# Patient Record
Sex: FEMALE | Race: WHITE | Employment: FULL TIME | URBAN - METROPOLITAN AREA
[De-identification: names, ages, dates, MRNs, and addresses within clinical notes are randomized per-mention and may not be internally consistent; named-entity substitution may affect disease eponyms.]

---

## 2017-03-19 ENCOUNTER — OFFICE VISIT (OUTPATIENT)
Dept: URGENT CARE | Facility: URGENT CARE | Age: 27
End: 2017-03-19
Payer: COMMERCIAL

## 2017-03-19 VITALS
SYSTOLIC BLOOD PRESSURE: 100 MMHG | WEIGHT: 174 LBS | HEIGHT: 66 IN | DIASTOLIC BLOOD PRESSURE: 60 MMHG | OXYGEN SATURATION: 99 % | HEART RATE: 78 BPM | TEMPERATURE: 99.1 F | BODY MASS INDEX: 27.97 KG/M2

## 2017-03-19 DIAGNOSIS — Z3A.16 16 WEEKS GESTATION OF PREGNANCY: ICD-10-CM

## 2017-03-19 DIAGNOSIS — R30.0 DYSURIA: Primary | ICD-10-CM

## 2017-03-19 LAB
ALBUMIN UR-MCNC: NEGATIVE MG/DL
APPEARANCE UR: CLEAR
BILIRUB UR QL STRIP: NEGATIVE
COLOR UR AUTO: YELLOW
GLUCOSE UR STRIP-MCNC: NEGATIVE MG/DL
HGB UR QL STRIP: NEGATIVE
KETONES UR STRIP-MCNC: NEGATIVE MG/DL
LEUKOCYTE ESTERASE UR QL STRIP: NEGATIVE
MICRO REPORT STATUS: NORMAL
NITRATE UR QL: NEGATIVE
PH UR STRIP: 6.5 PH (ref 5–7)
SP GR UR STRIP: 1.01 (ref 1–1.03)
SPECIMEN SOURCE: NORMAL
URN SPEC COLLECT METH UR: NORMAL
UROBILINOGEN UR STRIP-ACNC: 0.2 EU/DL (ref 0.2–1)
WET PREP SPEC: NORMAL

## 2017-03-19 PROCEDURE — 81003 URINALYSIS AUTO W/O SCOPE: CPT | Performed by: INTERNAL MEDICINE

## 2017-03-19 PROCEDURE — 87210 SMEAR WET MOUNT SALINE/INK: CPT | Performed by: INTERNAL MEDICINE

## 2017-03-19 PROCEDURE — 99213 OFFICE O/P EST LOW 20 MIN: CPT | Performed by: FAMILY MEDICINE

## 2017-03-19 NOTE — PATIENT INSTRUCTIONS
1. Dysuria:   16 weeks gestation of pregnancy: UA unremarkable. Wet prep negative   -Encouraged to drink plenty of fluids.   - *UA reflex to Microscopic and Culture ([facility] (except [place] and [place])  - Wet prep

## 2017-03-19 NOTE — NURSING NOTE
"Chief Complaint   Patient presents with     Urgent Care     UTI     16 weeks pregnant,urgency,chills,pressure in abdomin        Initial /60  Pulse 78  Temp 99.1  F (37.3  C) (Oral)  Ht 5' 6\" (1.676 m)  Wt 174 lb (78.9 kg)  LMP 11/27/2016  SpO2 99%  BMI 28.08 kg/m2 Estimated body mass index is 28.08 kg/(m^2) as calculated from the following:    Height as of this encounter: 5' 6\" (1.676 m).    Weight as of this encounter: 174 lb (78.9 kg).  Medication Reconciliation: complete  "

## 2017-03-19 NOTE — PROGRESS NOTES
"SUBJECTIVE:   Jaleesa Orozco is a 27 year old female who  presents today for a possible UTI. Symptoms of urgency and frequency have been going on for 1week(s). She is currently at 16 weeks pregnant.  Hematuria no.  gradual onset and mild. Positive for chills and vaginal discharge. There is no history of fever, nausea or vomiting.  No history of vaginal or penile discharge. This patient does  have a history of urinary tract infections. Patient denies flank pain, temperature > 101 degrees F. and Vomiting, significant nausea or diarrhea.     No past medical history on file.  Current Outpatient Prescriptions   Medication Sig Dispense Refill     Prenatal Multivit-Min-Fe-FA (PRENATAL VITAMINS PO)        VITAMIN D, CHOLECALCIFEROL, PO Take by mouth daily       Social History   Substance Use Topics     Smoking status: Never Smoker     Smokeless tobacco: Not on file     Alcohol use Not on file       ROS:   CONSTITUTIONAL:NEGATIVE for fever, chills, change in weight  RESP:NEGATIVE for significant cough or SOB  CV: NEGATIVE for chest pain, palpitations or peripheral edema  : Positive for frequency and urgency     OBJECTIVE:  /60  Pulse 78  Temp 99.1  F (37.3  C) (Oral)  Ht 5' 6\" (1.676 m)  Wt 174 lb (78.9 kg)  LMP 11/27/2016  SpO2 99%  BMI 28.08 kg/m2  GENERAL APPEARANCE: healthy, alert and no distress  RESP: lungs clear to auscultation - no rales, rhonchi or wheezes  CV: regular rates and rhythm, normal S1 S2, no murmur noted  ABDOMEN:  soft, nontender, no HSM or masses and bowel sounds normal. Gravid    ASSESSMENT:   1. Increase urinary frequency   2. 16 weeks gestation of pregnancy: UA unremarkable. Wet prep negative   -Encouraged to drink plenty of fluids.   - *UA reflex to Microscopic and Culture ([facility] (except [place] and [place])  - Wet prep      Gigi Escobar MD  [facility]  "

## 2017-03-19 NOTE — MR AVS SNAPSHOT
"              After Visit Summary   3/19/2017    Jaleesa Orozco    MRN: [id number]           Patient Information     Date Of Birth          1990        Visit Information        Provider Department      3/19/2017 12:30 PM Gigi Escobar MD [facility]        Today's Diagnoses     Dysuria    -  1    16 weeks gestation of pregnancy          Care Instructions    1. Dysuria:   16 weeks gestation of pregnancy: UA unremarkable. Wet prep negative   -Encouraged to drink plenty of fluids.   - *UA reflex to Microscopic and Culture ([facility] (except [place] and [place])  - Wet prep          Follow-ups after your visit        Who to contact     If you have questions or need follow up information about today's clinic visit or your schedule please contact [facility] directly at [phone].  Normal or non-critical lab and imaging results will be communicated to you by [employer], letter or phone within 4 business days after the clinic has received the results. If you do not hear from us within 7 days, please contact the clinic through [employer] or phone. If you have a critical or abnormal lab result, we will notify you by phone as soon as possible.  Submit refill requests through [employer] or call your pharmacy and they will forward the refill request to us. Please allow 3 business days for your refill to be completed.          Additional Information About Your Visit        MyChart Information     [employer] lets you send messages to your doctor, view your test results, renew your prescriptions, schedule appointments and more. To sign up, go to [email or web address] . Click on \"Log in\" on the left side of the screen, which will take you to the Welcome page. Then click on \"Sign up Now\" on the right side of the page.     You will be asked to enter the access code listed below, as well as some personal information. Please follow the directions to " "create your username and password.     Your access code is: [email or web address]  Expires: 2017  2:31 PM     Your access code will  in 90 days. If you need help or a new code, please call your [place] clinic or [phone].        Care EveryWhere ID     This is your Care EveryWhere ID. This could be used by other organizations to access your [place] medical records  [id number]        Your Vitals Were     Pulse Temperature Height Last Period Pulse Oximetry BMI (Body Mass Index)    78 99.1  F (37.3  C) (Oral) 5' 6\" (1.676 m) 2016 99% 28.08 kg/m2       Blood Pressure from Last 3 Encounters:   17 100/60    Weight from Last 3 Encounters:   17 174 lb (78.9 kg)              We Performed the Following     *UA reflex to Microscopic and Culture ([facility] [place] [facility] [place] [facility] (except [place] and [place])     Wet prep        Primary Care Provider    None       No address on file        Thank you!     Thank you for choosing [facility]  for your care. Our goal is always to provide you with excellent care. Hearing back from our patients is one way we can continue to improve our services. Please take a few minutes to complete the written survey that you may receive in the mail after your visit with us. Thank you!             Your Updated Medication List - Protect others around you: Learn how to safely use, store and throw away your medicines at www.disposemymeds.org.          This list is accurate as of: 3/19/17  2:31 PM.  Always use your most recent med list.                   Brand Name Dispense Instructions for use    PRENATAL VITAMINS PO          VITAMIN D (CHOLECALCIFEROL) PO      Take by mouth daily         "

## 2018-11-02 ENCOUNTER — OFFICE VISIT (OUTPATIENT)
Dept: FAMILY MEDICINE | Facility: CLINIC | Age: 28
End: 2018-11-02
Payer: COMMERCIAL

## 2018-11-02 VITALS
DIASTOLIC BLOOD PRESSURE: 73 MMHG | HEART RATE: 81 BPM | RESPIRATION RATE: 16 BRPM | OXYGEN SATURATION: 96 % | WEIGHT: 166.38 LBS | BODY MASS INDEX: 26.85 KG/M2 | TEMPERATURE: 97.6 F | SYSTOLIC BLOOD PRESSURE: 101 MMHG

## 2018-11-02 DIAGNOSIS — R07.0 THROAT PAIN: Primary | ICD-10-CM

## 2018-11-02 DIAGNOSIS — Z23 NEED FOR PROPHYLACTIC VACCINATION AND INOCULATION AGAINST INFLUENZA: ICD-10-CM

## 2018-11-02 DIAGNOSIS — B07.0 PLANTAR WARTS: ICD-10-CM

## 2018-11-02 LAB
DEPRECATED S PYO AG THROAT QL EIA: NORMAL
SPECIMEN SOURCE: NORMAL

## 2018-11-02 PROCEDURE — 90471 IMMUNIZATION ADMIN: CPT | Performed by: FAMILY MEDICINE

## 2018-11-02 PROCEDURE — 17110 DESTRUCTION B9 LES UP TO 14: CPT | Performed by: FAMILY MEDICINE

## 2018-11-02 PROCEDURE — 87880 STREP A ASSAY W/OPTIC: CPT | Performed by: FAMILY MEDICINE

## 2018-11-02 PROCEDURE — 87081 CULTURE SCREEN ONLY: CPT | Performed by: FAMILY MEDICINE

## 2018-11-02 PROCEDURE — 90686 IIV4 VACC NO PRSV 0.5 ML IM: CPT | Performed by: FAMILY MEDICINE

## 2018-11-02 PROCEDURE — 99213 OFFICE O/P EST LOW 20 MIN: CPT | Mod: 25 | Performed by: FAMILY MEDICINE

## 2018-11-02 RX ORDER — LEVONORGESTREL AND ETHINYL ESTRADIOL 0.15-0.03
1 KIT ORAL
COMMUNITY
Start: 2018-07-10

## 2018-11-02 NOTE — PROGRESS NOTES
SUBJECTIVE:   Jaleesa Orozco is a 28 year old female who presents to clinic today for the following health issues:    Throat pain      Duration: within 24 hours     Description (location/character/radiation): sore throat, and both ear pain     Accompanying signs and symptoms: none    History (similar episodes/previous evaluation): None    Therapies tried and outcome: None       Addition Concern: Plantar wart left big toe.      Presents with ST x 24 hours.  Finds it difficult to swallow.  No fever, no chills.  Ears feel plugged.  No N/V.  No cough.  1 year old toddler in - multiple sick exposures.    Has warts on LEFT great toe she would like treated today with liquid nitrogen therapy.  Has used other OTC remedies with no resolution.    Problem list and histories reviewed & adjusted, as indicated.  Additional history: as documented    There is no problem list on file for this patient.    History reviewed. No pertinent surgical history.    Social History   Substance Use Topics     Smoking status: Never Smoker     Smokeless tobacco: Never Used     Alcohol use Yes     Family History   Problem Relation Age of Onset     No Known Problems Mother      No Known Problems Father          Current Outpatient Prescriptions   Medication Sig Dispense Refill     levonorgestrel-ethinyl estradiol (SEASONALE) 0.15-0.03 MG per tablet Take 1 tablet by mouth       No Known Allergies  No lab results found.   BP Readings from Last 3 Encounters:   11/02/18 101/73   03/19/17 100/60    Wt Readings from Last 3 Encounters:   11/02/18 166 lb 6 oz (75.5 kg)   03/19/17 174 lb (78.9 kg)                    Reviewed and updated as needed this visit by clinical staff  Tobacco  Allergies  Meds  Med Hx  Surg Hx  Fam Hx  Soc Hx      Reviewed and updated as needed this visit by Provider         ROS:  Constitutional, HEENT, cardiovascular, pulmonary, gi and gu systems are negative, except as otherwise noted.    OBJECTIVE:     /73   Pulse 81  Temp 97.6  F (36.4  C) (Oral)  Resp 16  Wt 166 lb 6 oz (75.5 kg)  LMP  (LMP Unknown)  SpO2 96%  Breastfeeding? No  BMI 26.85 kg/m2  Body mass index is 26.85 kg/(m^2).  GENERAL: healthy, alert and no distress  EYES: Eyes grossly normal to inspection, PERRL and conjunctivae and sclerae normal  HENT: ear canals and TM's normal, nose and mouth without ulcers or lesions, no erythema or exudate of oropharynx today  NECK: no adenopathy, no asymmetry, masses, or scars and thyroid normal to palpation  RESP: lungs clear to auscultation - no rales, rhonchi or wheezes  CV: regular rate and rhythm, normal S1 S2, no S3 or S4, no murmur, click or rub, no peripheral edema and peripheral pulses strong  MS: no gross musculoskeletal defects noted, no edema  SKIN: 3 areas of coalesced warts on plantar surface of LEFT great toe pad-- each area treated with 3- 5 second freeze-thaw cycles of liquid nitrogen therapy.  BandAid applied.  PSYCH: mentation appears normal, affect normal/bright    Diagnostic Test Results:  Results for orders placed or performed in visit on 11/02/18 (from the past 24 hour(s))   Rapid strep screen   Result Value Ref Range    Specimen Description Throat     Rapid Strep A Screen       NEGATIVE: No Group A streptococcal antigen detected by immunoassay, await culture report.       ASSESSMENT/PLAN:     1. Throat pain    - Rapid strep screen  - Beta strep group A culture    Reassured RST is negative.  TC pending.  Recommend supportive cares including Tylenol/ibuprofen prn, increased fluids, lozenges, rest.  Follow-up if no change or worsening symptoms.    2. Plantar warts    - DESTRUCT BENIGN LESION, UP TO 14    Follow-up in 2 weeks for retreatment prn incomplete resolution.    3. Need for prophylactic vaccination and inoculation against influenza    - FLU VACCINE, SPLIT VIRUS, IM (QUADRIVALENT) [09981]- >3 YRS  - Vaccine Administration, Initial [45744]    Flu vaccine updated.    Kathy Hernandez,  MD  [facility]

## 2018-11-02 NOTE — PROGRESS NOTES

## 2018-11-02 NOTE — MR AVS SNAPSHOT
"              After Visit Summary   11/2/2018    Jaleesa Orozco    MRN: [id number]           Patient Information     Date Of Birth          1990        Visit Information        Provider Department      11/2/2018 9:40 AM Kathy Hernandez MD [facility]        Today's Diagnoses     Throat pain    -  1    Plantar warts        Need for prophylactic vaccination and inoculation against influenza          Care Instructions    Flu vaccine then home          Follow-ups after your visit        Follow-up notes from your care team     Return in about 1 week (around 11/9/2018), or if symptoms worsen or fail to improve.      Who to contact     If you have questions or need follow up information about today's clinic visit or your schedule please contact [facility] directly at [phone].  Normal or non-critical lab and imaging results will be communicated to you by MyChart, letter or phone within 4 business days after the clinic has received the results. If you do not hear from us within 7 days, please contact the clinic through MyChart or phone. If you have a critical or abnormal lab result, we will notify you by phone as soon as possible.  Submit refill requests through [employer] or call your pharmacy and they will forward the refill request to us. Please allow 3 business days for your refill to be completed.          Additional Information About Your Visit        MyChart Information     [employer] lets you send messages to your doctor, view your test results, renew your prescriptions, schedule appointments and more. To sign up, go to [email or web address] . Click on \"Log in\" on the left side of the screen, which will take you to the Welcome page. Then click on \"Sign up Now\" on the right side of the page.     You will be asked to enter the access code listed below, as well as some personal information. Please follow the directions to create your username and " password.     Your access code is: [email or web address]  Expires: 2019  8:37 AM     Your access code will  in 90 days. If you need help or a new code, please call your [place] clinic or [phone].        Care EveryWhere ID     This is your Care EveryWhere ID. This could be used by other organizations to access your [place] medical records  [id number]        Your Vitals Were     Pulse Temperature Respirations Last Period Pulse Oximetry Breastfeeding?    81 97.6  F (36.4  C) (Oral) 16 (LMP Unknown) 96% No    BMI (Body Mass Index)                   26.85 kg/m2            Blood Pressure from Last 3 Encounters:   18 101/73   17 100/60    Weight from Last 3 Encounters:   18 166 lb 6 oz (75.5 kg)   17 174 lb (78.9 kg)              We Performed the Following     Beta strep group A culture     DESTRUCT BENIGN LESION, UP TO 14     FLU VACCINE, SPLIT VIRUS, IM (QUADRIVALENT) [12526]- >3 YRS     Rapid strep screen     Vaccine Administration, Initial [90914]          Today's Medication Changes          These changes are accurate as of 18 11:59 PM.  If you have any questions, ask your nurse or doctor.               Stop taking these medicines if you haven't already. Please contact your care team if you have questions.     PRENATAL VITAMINS PO   Stopped by:  Kathy Hernandez MD           VITAMIN D (CHOLECALCIFEROL) PO   Stopped by:  Kathy Hernandez MD                    Primary Care Provider Office Phone # Fax #    Kathy Hernandez [phone] [address] [place] [address]        Equal Access to Services     [facility]: Hadii ricardo Grant, wanalinida luqadaha, qaybta kaalmada julieth, david duval. So [facility] [phone].    ATENCIÓN: Si habla español, tiene a lamb disposición servicios gratuitos de asistencia lingüística. Llame al [phone].    We comply with  applicable federal civil rights laws and [place] laws. We do not discriminate on the basis of race, color, national origin, age, disability, sex, sexual orientation, or gender identity.            Thank you!     Thank you for choosing [facility]  for your care. Our goal is always to provide you with excellent care. Hearing back from our patients is one way we can continue to improve our services. Please take a few minutes to complete the written survey that you may receive in the mail after your visit with us. Thank you!             Your Updated Medication List - Protect others around you: Learn how to safely use, store and throw away your medicines at www.disposemymeds.org.          This list is accurate as of 11/2/18 11:59 PM.  Always use your most recent med list.                   Brand Name Dispense Instructions for use Diagnosis    levonorgestrel-ethinyl estradiol 0.15-0.03 MG per tablet    SEASONALE     Take 1 tablet by mouth

## 2018-11-03 LAB
BACTERIA SPEC CULT: NORMAL
SPECIMEN SOURCE: NORMAL

## 2020-10-02 ENCOUNTER — ANCILLARY PROCEDURE (OUTPATIENT)
Dept: GENERAL RADIOLOGY | Facility: CLINIC | Age: 30
End: 2020-10-02
Attending: NURSE PRACTITIONER
Payer: COMMERCIAL

## 2020-10-02 ENCOUNTER — OFFICE VISIT (OUTPATIENT)
Dept: URGENT CARE | Facility: URGENT CARE | Age: 30
End: 2020-10-02
Payer: COMMERCIAL

## 2020-10-02 VITALS
OXYGEN SATURATION: 96 % | BODY MASS INDEX: 25.63 KG/M2 | TEMPERATURE: 98.8 F | SYSTOLIC BLOOD PRESSURE: 124 MMHG | WEIGHT: 158.8 LBS | RESPIRATION RATE: 16 BRPM | DIASTOLIC BLOOD PRESSURE: 68 MMHG | HEART RATE: 78 BPM

## 2020-10-02 DIAGNOSIS — M25.532 PAIN IN BOTH WRISTS: ICD-10-CM

## 2020-10-02 DIAGNOSIS — M25.531 PAIN IN BOTH WRISTS: ICD-10-CM

## 2020-10-02 DIAGNOSIS — S60.212A CONTUSION OF LEFT WRIST, INITIAL ENCOUNTER: Primary | ICD-10-CM

## 2020-10-02 DIAGNOSIS — M79.641 PAIN OF RIGHT HAND: ICD-10-CM

## 2020-10-02 DIAGNOSIS — M79.642 PAIN OF LEFT HAND: ICD-10-CM

## 2020-10-02 PROCEDURE — 99213 OFFICE O/P EST LOW 20 MIN: CPT | Performed by: NURSE PRACTITIONER

## 2020-10-02 PROCEDURE — 73110 X-RAY EXAM OF WRIST: CPT | Mod: LT | Performed by: RADIOLOGY

## 2020-10-02 PROCEDURE — 73110 X-RAY EXAM OF WRIST: CPT | Mod: RT | Performed by: RADIOLOGY

## 2020-10-02 NOTE — LETTER
October 2, 2020      Jaleesa Orozco  [address] [facility] [address]        To Whom It May Concern:    Jaleesa Orozco  was seen on 10/2/2020. Please allow her to wear a left wrist splint over the next couple of weeks.        Sincerely,        Devi Hoyt NP

## 2020-10-03 NOTE — PATIENT INSTRUCTIONS
Patient Education     RICE     Rest an injury, elevate it, and use ice and compression as directed.   RICE stands for rest, ice, compression, and elevation. These can limit pain and swelling after an injury. RICE may be recommended to help treat breaks (fractures), sprains, strains, and bruises or bumps.   Home care  Here are the details of RICE:    Rest. Limit the use of the injured body part. This helps prevent further damage to the body part and gives it time to heal. In some cases, you may need a sling, brace, splint, or cast to help keep the body part still until it has healed.    Ice. Applying ice right after an injury helps relieve pain and swelling. To make an ice pack, put ice cubes in a plastic bag that seals at the top. Wrap the bag in a clean, thin towel or cloth. Then place it over the injured area. Do this for 10 to 15 minutes every 3 to 4 hours. Continue for the next 1 to 3 days or until your symptoms improve. Never put ice directly on your skin. Don't ice an area longer than 15 minutes at a time.    Compression. Putting pressure on an injury helps reduce swelling and provides support. Wrap the injured area firmly with an elastic bandage or wrap. Make sure not to wrap the bandage too tightly or you will cut off blood flow to the injured area. If your bandage loosens, rewrap it.    Elevation. Keeping an injury raised or elevated above the level of your heart reduces swelling, pain, and throbbing. For instance, if you have a broken leg, it may help to rest your leg on several pillows when sitting or lying down. Try to keep the injured area elevated as often as possible.  Follow-up care  Follow up with your healthcare provider, or as advised.  When to seek medical advice  Call your healthcare provider right away if any of these occur:    Fever of 100.4 F (38 C) or higher, or as directed by your healthcare provider    Chills    Increased pain or swelling in the injured body part    Injured body part  becomes cold, blue, numb, or tingly    Signs of infection. These include warmth in the skin, redness, drainage, or bad smell coming from the injured body part.  Date Last Reviewed: 6/1/2018 2000-2019 [employer]. [address] [place] [address]. All rights reserved. This information is not intended as a substitute for professional medical care. Always follow your healthcare professional's instructions.           Patient Education     Upper Extremity Contusion  You have a contusion (bruise) of an upper extremity (arm, wrist, hand, or fingers). Symptoms include pain, swelling, and skin discoloration. No bones are broken. This injury may take from a few days to a few weeks to heal.  During that time, the bruise may change from reddish in color, to purple-blue, to green-yellow, to yellow-brown.  Home care    Unless another medicine was prescribed, you can take acetaminophen, ibuprofen, or naproxen to control pain. (If you have chronic liver or kidney disease or ever had a stomach ulcer or gastrointestinal bleeding, talk with your doctor before using these medicines.)    Elevate the injured area to reduce pain and swelling. As much as possible, sit or lie down with the injured area raised about the level of your heart. This is especially important during the first 48 hours.    Ice the injured area to help reduce pain and swelling. Wrap a cold source (ice pack or ice cubes in a plastic bag) in a thin towel. Apply to the bruised area for 20 minutes every 1 to 2 hours the first day. Continue this 3 to 4 times a day until the pain and swelling goes away.    If a sling was provided, you may remove it to shower or bathe. To prevent joint stiffness, do not wear it for more than 1 week.  Follow-up care  Follow up with your healthcare provide, or as advised. Call if you are not improving within the next 1 to 2 weeks.  When to seek medical advice   Call your healthcare provider right away if any of these  occur:    Increased pain or swelling    Hand or fingers become cold, blue, numb or tingly    Signs of infection: Warmth, drainage, or increased redness or pain around the injury    Inability to move the injured body part     Frequent bruising for unknown reasons  Date Last Reviewed: 2/1/2017 2000-2019 [employer]. [address] [place] [address]. All rights reserved. This information is not intended as a substitute for professional medical care. Always follow your healthcare professional's instructions.

## 2020-10-03 NOTE — PROGRESS NOTES
SUBJECTIVE:   Jaleesa Orozco is a 30 year old female presenting with a chief complaint of   Chief Complaint   Patient presents with     Urgent Care     Fall     while walking up a step. Caugh herself      She is an established patient of [facility].    MS Injury/Pain    Onset of symptoms was 2 hour(s) ago.  Location: left wrist and right wrist, left worse  Context:       The injury happened while at home      Mechanism: fall on outstretched hands, holding phone in right hand broke her fall      Patient experienced immediate pain, immediate swelling  Course of symptoms is same.    Severity moderate 5/10, throbbing or aching  Current and Associated symptoms: Pain, Swelling and decreased strength  Denies  Bruising, Warmth and Redness,  Numbness, tingling  Aggravating Factors: movement and squeezing  Therapies to improve symptoms include: ice and Tylenol which didn't seem to help yet  This is the first time this type of problem has occurred for this patient.   Patient is right handed    Problem list, Medication list, Allergies, and Medical history reviewed in EPIC.    ROS:  Review of systems negative except for noted above    OBJECTIVE  /68   Pulse 78   Temp 98.8  F (37.1  C) (Oral)   Resp 16   Wt 72 kg (158 lb 12.8 oz)   SpO2 96%   BMI 25.63 kg/m      Physical Exam  Constitutional:       General: She is not in acute distress.     Appearance: She is not toxic-appearing.   HENT:      Head: Normocephalic and atraumatic.   Cardiovascular:      Pulses: Normal pulses.   Musculoskeletal:      Right hand: She exhibits bony tenderness. She exhibits normal range of motion, normal capillary refill and no swelling. Normal sensation noted. Normal strength noted.      Left hand: She exhibits bony tenderness and swelling. She exhibits normal range of motion and normal capillary refill. Normal sensation noted. Decreased strength noted.      Comments: Right hand tenderness with palpation 1st and 2nd metacarpal. Left hand  tenderness with palpation of 1st, 2nd, and 3rd metacarpals. Mild edema left hand. Fully able to supinate and pronate. RUE 5/5 strength, LUE 4/5 strength   Skin:     General: Skin is warm and dry.      Capillary Refill: Capillary refill takes less than 2 seconds.      Findings: No bruising or erythema.       X-ray bilateral wrists was performed and reviewed independently by myself showing no obvious fracture  Radiologist impression:   :  Results for orders placed or performed in visit on 10/02/20 (from the past 24 hour(s))   XR Wrist Right G/E 3 Views    Narrative    Examination:  XR WRIST RT G/E 3 VW    Date:  10/2/2020 8:56 PM     Clinical Information: Fell landing on outstretched hands, pain at base  of thumbs; Pain of right hand     Additional Information: none    Comparison: none      Impression    Impression:    1.  Right wrist negative for fracture or joint malalignment.  2.  Some wrist fractures can be radiographically occult. If there is  clinical index of suspicion for nondisplaced fracture, would recommend  splinting with follow-up radiographs (7-10 days), or cross-sectional  imaging for further evaluation.    MEHDI PRINGLE MD   XR Wrist Left G/E 3 Views    Narrative    Examination:  XR WRIST LEFT G/E 3 VIEWS    Date:  10/2/2020 8:56 PM     Clinical Information: Left hand pain after a fall.    Additional Information: none    Comparison: none      Impression    Impression:  1.  Left wrist negative for fracture or joint malalignment.  2.  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  3.  Some fractures in the wrist can be radiographically occult. If  there is focal snuffbox tenderness or if there is a high clinical  index of concern for nondisplaced fracture, would recommend splinting  with consideration of follow-up radiographs (7-10 days), or  cross-sectional imaging for further evaluation. .    MEHDI PRINGLE MD       ASSESSMENT:      ICD-10-CM    1. Contusion of left wrist,  initial encounter  S60.212A    2. Pain in both wrists  M25.531     M25.532    3. Pain of left hand  M79.642 XR Wrist Left G/E 3 Views     WRIST SPLINT     CANCELED: XR Hand Left G/E 3 Views   4. Pain of right hand  M79.641 XR Wrist Right G/E 3 Views     CANCELED: XR Hand Right G/E 3 Views      PLAN:    Reviewed xray images and results with patient during visit showing no obvious fractures. Recommended conservative treatment with RICE: rest, ice for 20 minutes every hour, compress, elevate. May take anti-inflammatory such as ibuprofen 600 mg every 6 hours with food as needed unless contraindicated. She is fitted with a wrist splint with thumb spica for left wrist. Letter given for work to allow wrist splint.     Follow-up with PCP if symptoms persist for 7-10 days as repeat x-ray may be necessary, and sooner if symptoms worsen or new symptoms develop.     Discussed red flag symptoms which warrant immediate visit in emergency room    All questions were answered and patient verbalized understanding. AVS reviewed with patient.     Devi Hoyt, CARLOS, APRN, CNP 10/2/2020 9:05 PM

## 2021-03-06 ENCOUNTER — HEALTH MAINTENANCE LETTER (OUTPATIENT)
Age: 31
End: 2021-03-06

## 2021-10-09 ENCOUNTER — HEALTH MAINTENANCE LETTER (OUTPATIENT)
Age: 31
End: 2021-10-09

## 2022-03-26 ENCOUNTER — HEALTH MAINTENANCE LETTER (OUTPATIENT)
Age: 32
End: 2022-03-26

## 2022-09-11 ENCOUNTER — HEALTH MAINTENANCE LETTER (OUTPATIENT)
Age: 32
End: 2022-09-11

## 2023-05-06 ENCOUNTER — HEALTH MAINTENANCE LETTER (OUTPATIENT)
Age: 33
End: 2023-05-06